# Patient Record
Sex: MALE | Race: WHITE | URBAN - METROPOLITAN AREA
[De-identification: names, ages, dates, MRNs, and addresses within clinical notes are randomized per-mention and may not be internally consistent; named-entity substitution may affect disease eponyms.]

---

## 2021-08-24 ENCOUNTER — OFFICE VISIT (OUTPATIENT)
Dept: URBAN - METROPOLITAN AREA CLINIC 46 | Facility: CLINIC | Age: 86
End: 2021-08-24

## 2021-08-24 PROBLEM — 15188001 HEARING LOSS: Status: ACTIVE | Noted: 2021-08-24

## 2021-08-24 PROBLEM — 13644009 HYPERCHOLESTEROLEMIA: Status: ACTIVE | Noted: 2021-08-24

## 2021-08-28 ENCOUNTER — TELEPHONE ENCOUNTER (OUTPATIENT)
Dept: URBAN - METROPOLITAN AREA CLINIC 13 | Facility: CLINIC | Age: 86
End: 2021-08-28

## 2021-08-29 ENCOUNTER — TELEPHONE ENCOUNTER (OUTPATIENT)
Dept: URBAN - METROPOLITAN AREA CLINIC 13 | Facility: CLINIC | Age: 86
End: 2021-08-29

## 2021-08-29 RX ORDER — FOLIC ACID/VIT B COMPLEX AND C 400 MCG
TABLET ORAL
Status: ACTIVE | COMMUNITY

## 2021-08-29 RX ORDER — CEPHALEXIN 500 MG/1
CAPSULE ORAL
Status: ACTIVE | COMMUNITY

## 2021-08-29 RX ORDER — ASPIRIN 81 MG/1
TABLET, COATED ORAL
Status: ACTIVE | COMMUNITY

## 2021-08-29 RX ORDER — METOPROLOL SUCCINATE 25 MG/1
TABLET, EXTENDED RELEASE ORAL
Status: ACTIVE | COMMUNITY

## 2021-08-29 RX ORDER — ATORVASTATIN CALCIUM 20 MG/1
TABLET ORAL
Status: ACTIVE | COMMUNITY

## 2021-08-29 RX ORDER — DIPHENHYDRAMINE HCL 25 MG/1
TABLET ORAL
Status: ACTIVE | COMMUNITY

## 2021-08-29 RX ORDER — VALACYCLOVIR 1 G/1
TABLET, FILM COATED ORAL
Status: ACTIVE | COMMUNITY

## 2021-08-29 RX ORDER — PANTOPRAZOLE SODIUM 40 MG/1
TABLET, DELAYED RELEASE ORAL
Status: ACTIVE | COMMUNITY
Start: 2021-08-24

## 2021-09-28 ENCOUNTER — OFFICE VISIT (OUTPATIENT)
Dept: URBAN - METROPOLITAN AREA CLINIC 48 | Facility: CLINIC | Age: 86
End: 2021-09-28
Payer: MEDICARE

## 2021-09-28 VITALS
DIASTOLIC BLOOD PRESSURE: 58 MMHG | HEIGHT: 66 IN | TEMPERATURE: 98.1 F | WEIGHT: 150.8 LBS | SYSTOLIC BLOOD PRESSURE: 122 MMHG | HEART RATE: 60 BPM | BODY MASS INDEX: 24.23 KG/M2

## 2021-09-28 DIAGNOSIS — K21.9: ICD-10-CM

## 2021-09-28 DIAGNOSIS — R19.4 ALTERED BOWEL FUNCTION: ICD-10-CM

## 2021-09-28 PROCEDURE — 99213 OFFICE O/P EST LOW 20 MIN: CPT | Performed by: INTERNAL MEDICINE

## 2021-09-28 RX ORDER — PANTOPRAZOLE SODIUM 40 MG/1
TABLET, DELAYED RELEASE ORAL
Status: ACTIVE | COMMUNITY
Start: 2021-08-24

## 2021-09-28 RX ORDER — ATORVASTATIN CALCIUM 20 MG/1
TABLET ORAL
Status: ACTIVE | COMMUNITY

## 2021-09-28 RX ORDER — FOLIC ACID/VIT B COMPLEX AND C 400 MCG
TABLET ORAL
Status: ACTIVE | COMMUNITY

## 2021-09-28 RX ORDER — DIPHENHYDRAMINE HCL 25 MG/1
TABLET ORAL
Status: ACTIVE | COMMUNITY

## 2021-09-28 RX ORDER — CEPHALEXIN 500 MG/1
CAPSULE ORAL
Status: ACTIVE | COMMUNITY

## 2021-09-28 RX ORDER — PANTOTHENIC ACID (VIT B5) 500 MG
AS DIRECTED TABLET ORAL
Status: ACTIVE | COMMUNITY

## 2021-09-28 RX ORDER — ASPIRIN 81 MG/1
TABLET, COATED ORAL
Status: ACTIVE | COMMUNITY

## 2021-09-28 RX ORDER — PANTOPRAZOLE SODIUM 40 MG/1
TABLET, DELAYED RELEASE ORAL
OUTPATIENT
Start: 2021-08-24

## 2021-09-28 RX ORDER — VALACYCLOVIR 1 G/1
TABLET, FILM COATED ORAL
Status: ACTIVE | COMMUNITY

## 2021-09-28 RX ORDER — METOPROLOL SUCCINATE 25 MG/1
TABLET, EXTENDED RELEASE ORAL
Status: ACTIVE | COMMUNITY

## 2021-09-28 NOTE — HPI-TODAY'S VISIT:
Demario Banerjee is a 86 year old,  male, who was seen about a month ago with concerns regarding GERD and change in bowels. He also saw ENT in October with complaints of coughing when swallowing solids and liquids. He reported coughing and sneezing when he eats, and was using Tums and OTC Nexium on an as needed basis, but nothing daily. He denied any difficulty passing the food when he swallows. He was started on Pantoprazole 40 mg daily and reports 75% improvement in symptoms; still denies difficulty swallowing.   He also c/o change in bowels over the last 6 weeks. He normally has one bowel movement per day, but stated stools have been loose aside from 2 normal stools in the last several weeks; he has also had urgency and a few episodes of incontinence. He denied abdominal pain or blood in stool. Stool studies obtained and negative for infectious pathogens, but with mild increase in fecal calpro and  moderate pancreatic insufficiency. He was treated with Flagyl 10 days followed by probiotic. He reports diarrhea has resolved, and he is now mildly constipated, having small, hard stools.   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

## 2021-10-11 ENCOUNTER — TELEPHONE ENCOUNTER (OUTPATIENT)
Dept: URBAN - METROPOLITAN AREA CLINIC 46 | Facility: CLINIC | Age: 86
End: 2021-10-11

## 2021-10-11 RX ORDER — PANTOPRAZOLE SODIUM 40 MG/1
1 TABLET TABLET, DELAYED RELEASE ORAL
Qty: 90 | Refills: 2
Start: 2021-08-24

## 2021-10-14 ENCOUNTER — OFFICE VISIT (OUTPATIENT)
Dept: URBAN - METROPOLITAN AREA CLINIC 48 | Facility: CLINIC | Age: 86
End: 2021-10-14
Payer: MEDICARE

## 2021-10-14 VITALS
TEMPERATURE: 77.5 F | OXYGEN SATURATION: 97 % | WEIGHT: 151.6 LBS | DIASTOLIC BLOOD PRESSURE: 66 MMHG | HEART RATE: 54 BPM | SYSTOLIC BLOOD PRESSURE: 128 MMHG | HEIGHT: 66 IN | BODY MASS INDEX: 24.36 KG/M2

## 2021-10-14 DIAGNOSIS — K21.9: ICD-10-CM

## 2021-10-14 DIAGNOSIS — R19.4 ALTERED BOWEL FUNCTION: ICD-10-CM

## 2021-10-14 PROCEDURE — 99213 OFFICE O/P EST LOW 20 MIN: CPT | Performed by: PHYSICIAN ASSISTANT

## 2021-10-14 RX ORDER — VALACYCLOVIR 1 G/1
TABLET, FILM COATED ORAL
Status: ON HOLD | COMMUNITY

## 2021-10-14 RX ORDER — FOLIC ACID/VIT B COMPLEX AND C 400 MCG
TABLET ORAL
Status: ON HOLD | COMMUNITY

## 2021-10-14 RX ORDER — PANTOTHENIC ACID (VIT B5) 500 MG
AS DIRECTED TABLET ORAL
Status: ACTIVE | COMMUNITY

## 2021-10-14 RX ORDER — METOPROLOL SUCCINATE 25 MG/1
TABLET, EXTENDED RELEASE ORAL
Status: ACTIVE | COMMUNITY

## 2021-10-14 RX ORDER — DIPHENHYDRAMINE HCL 25 MG/1
TABLET ORAL
Status: ACTIVE | COMMUNITY

## 2021-10-14 RX ORDER — CEPHALEXIN 500 MG/1
CAPSULE ORAL
Status: ACTIVE | COMMUNITY

## 2021-10-14 RX ORDER — ATORVASTATIN CALCIUM 20 MG/1
TABLET ORAL
Status: ACTIVE | COMMUNITY

## 2021-10-14 RX ORDER — ASPIRIN 81 MG/1
TABLET, COATED ORAL
Status: ACTIVE | COMMUNITY

## 2021-10-14 RX ORDER — PANTOPRAZOLE SODIUM 40 MG/1
1 TABLET TABLET, DELAYED RELEASE ORAL
Qty: 90 | Refills: 2 | Status: ACTIVE | COMMUNITY
Start: 2021-08-24

## 2021-10-14 RX ORDER — PANTOTHENIC ACID (VIT B5) 500 MG
AS DIRECTED TABLET ORAL
OUTPATIENT

## 2021-10-14 NOTE — PHYSICAL EXAM HENT:
Head, normocephalic, atraumatic, Face, Face within normal limits, Ears, External ears within normal limits, significant hearing impaired Nose/Nasopharynx, External nose normal appearance, nares patent, no nasal discharge, Mouth and Throat, Oral cavity appearance normal, Breath odor normal, Lips, Appearance normal

## 2021-10-14 NOTE — HPI-TODAY'S VISIT:
Demario Banerjee is a 86 year old,  male, who was recently seen with concerns regarding GERD and change in bowels. He also saw ENT in October with complaints of coughing when swallowing solids and liquids. He denied any difficulty passing the food when he swallows. He was started on Pantoprazole 40 mg daily and reported 75% improvement in symptoms at last follow up. No change with this since he was last seen.   He also c/o change in bowels for several week from normal to loose stool with urgency and a few episodes of incontinence. He denied abdominal pain or blood in stool. Stool studies obtained and negative for infectious pathogens, but with mild increase in fecal calpro and  moderate pancreatic insufficiency. He was treated with Flagyl 10 days followed by probiotic with resolution, and was even mildly constipated. He returns today due to continued bowel issues. He states he had diarrhea several times on Sunday with significant urgency (no diarrhea otherwise), and since then has been constipated.  He eats a lot of fiber in his diet. He has no abdominal pain or blood in stool. Weight is stable.   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

## 2021-10-28 ENCOUNTER — OFFICE VISIT (OUTPATIENT)
Dept: URBAN - METROPOLITAN AREA CLINIC 48 | Facility: CLINIC | Age: 86
End: 2021-10-28
Payer: MEDICARE

## 2021-10-28 ENCOUNTER — LAB OUTSIDE AN ENCOUNTER (OUTPATIENT)
Dept: URBAN - METROPOLITAN AREA CLINIC 48 | Facility: CLINIC | Age: 86
End: 2021-10-28

## 2021-10-28 VITALS
BODY MASS INDEX: 24.08 KG/M2 | HEART RATE: 52 BPM | WEIGHT: 149.8 LBS | HEIGHT: 66 IN | SYSTOLIC BLOOD PRESSURE: 138 MMHG | TEMPERATURE: 97.7 F | DIASTOLIC BLOOD PRESSURE: 73 MMHG

## 2021-10-28 DIAGNOSIS — K21.9: ICD-10-CM

## 2021-10-28 DIAGNOSIS — Z86.010 PERSONAL HISTORY OF COLONIC POLYPS: ICD-10-CM

## 2021-10-28 DIAGNOSIS — R19.4 ALTERED BOWEL FUNCTION: ICD-10-CM

## 2021-10-28 DIAGNOSIS — R63.4 WEIGHT LOSS: ICD-10-CM

## 2021-10-28 DIAGNOSIS — R14.3 EXCESSIVE FLATUS: ICD-10-CM

## 2021-10-28 DIAGNOSIS — R63.0 LOSS OF APPETITE: ICD-10-CM

## 2021-10-28 PROCEDURE — 99214 OFFICE O/P EST MOD 30 MIN: CPT | Performed by: INTERNAL MEDICINE

## 2021-10-28 RX ORDER — METOPROLOL SUCCINATE 25 MG/1
TABLET, EXTENDED RELEASE ORAL
COMMUNITY

## 2021-10-28 RX ORDER — CEPHALEXIN 500 MG/1
CAPSULE ORAL
COMMUNITY

## 2021-10-28 RX ORDER — VALACYCLOVIR 1 G/1
TABLET, FILM COATED ORAL
COMMUNITY

## 2021-10-28 RX ORDER — ATORVASTATIN CALCIUM 20 MG/1
TABLET ORAL
COMMUNITY

## 2021-10-28 RX ORDER — FOLIC ACID/VIT B COMPLEX AND C 400 MCG
TABLET ORAL
COMMUNITY

## 2021-10-28 RX ORDER — ASPIRIN 81 MG/1
TABLET, COATED ORAL
COMMUNITY

## 2021-10-28 RX ORDER — PANTOTHENIC ACID (VIT B5) 500 MG
AS DIRECTED TABLET ORAL
COMMUNITY

## 2021-10-28 RX ORDER — DIPHENHYDRAMINE HCL 25 MG/1
TABLET ORAL
COMMUNITY

## 2021-10-28 RX ORDER — PANTOPRAZOLE SODIUM 40 MG/1
1 TABLET TABLET, DELAYED RELEASE ORAL
Qty: 90 | Refills: 2 | COMMUNITY
Start: 2021-08-24

## 2021-10-28 NOTE — HPI-TODAY'S VISIT:
Demario Banerjee is a 86 year old,  male, who presents for f/u regarding GERD and change in bowels. He also saw ENT in October with complaints of coughing when swallowing solids and liquids. He denied any difficulty passing the food when he swallows. He was started on Pantoprazole 40 mg daily and reported improvement, but incomplete resolution. He also feels his appetite is overall decreased, and he has slowly been losing weight.   He also c/o change in bowels for several week from normal to loose stool with urgency and a few episodes of incontinence. He denied abdominal pain or blood in stool. Stool studies obtained and negative for infectious pathogens, but with mild increase in fecal calpro and  moderate pancreatic insufficiency. He was treated with Flagyl X 10 days followed by probiotic with resolution, and was even mildly constipated. Seen two weeks ago with one bout of diarrhea, and mostly constipation. Advised bowel clean out with Mg Citrate followed by Daily Miralax. He states he lost the instructions given, and took a laxative recommended by the pharmacist; continues to take Miralax nightly. He is having a bowel movement every morning, but continues to have excessive gas, and sensation of incomplete evacuation. He states "he just feels like something is not right." Still denies blood in stool. He eats a lot of fiber in his diet.   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   He had an echocardiogram January 2020 which showed EF 74%, mildl diastolic dysfunction and mildly reduced systolic function; mild MV thickening. He also had NM MPI at that time which showed low risk for cardiovascular event. He remains very active at present.

## 2021-12-16 ENCOUNTER — OFFICE VISIT (OUTPATIENT)
Dept: URBAN - METROPOLITAN AREA SURGERY CENTER 27 | Facility: SURGERY CENTER | Age: 86
End: 2021-12-16
Payer: MEDICARE

## 2021-12-16 ENCOUNTER — CLAIMS CREATED FROM THE CLAIM WINDOW (OUTPATIENT)
Dept: URBAN - METROPOLITAN AREA CLINIC 4 | Facility: CLINIC | Age: 86
End: 2021-12-16
Payer: MEDICARE

## 2021-12-16 DIAGNOSIS — K64.1 BLEEDING GRADE II HEMORRHOIDS: ICD-10-CM

## 2021-12-16 DIAGNOSIS — K63.5 BENIGN COLON POLYP: ICD-10-CM

## 2021-12-16 DIAGNOSIS — R13.10 ABNORMAL DEGLUTITION: ICD-10-CM

## 2021-12-16 DIAGNOSIS — K63.89 POLYP, HYPERPLASTIC: ICD-10-CM

## 2021-12-16 DIAGNOSIS — R19.4 ALTERATION IN BOWEL ELIMINATION: ICD-10-CM

## 2021-12-16 DIAGNOSIS — K57.30 ACQUIRED DIVERTICULOSIS OF COLON: ICD-10-CM

## 2021-12-16 PROCEDURE — 88313 SPECIAL STAINS GROUP 2: CPT | Performed by: PATHOLOGY

## 2021-12-16 PROCEDURE — 88342 IMHCHEM/IMCYTCHM 1ST ANTB: CPT | Performed by: PATHOLOGY

## 2021-12-16 PROCEDURE — 88305 TISSUE EXAM BY PATHOLOGIST: CPT | Performed by: PATHOLOGY

## 2021-12-16 PROCEDURE — 43248 EGD GUIDE WIRE INSERTION: CPT | Performed by: INTERNAL MEDICINE

## 2021-12-16 PROCEDURE — 45380 COLONOSCOPY AND BIOPSY: CPT | Performed by: INTERNAL MEDICINE

## 2021-12-16 PROCEDURE — G8907 PT DOC NO EVENTS ON DISCHARG: HCPCS | Performed by: INTERNAL MEDICINE

## 2021-12-16 PROCEDURE — 45385 COLONOSCOPY W/LESION REMOVAL: CPT | Performed by: INTERNAL MEDICINE

## 2021-12-16 RX ORDER — ATORVASTATIN CALCIUM 20 MG/1
TABLET ORAL
COMMUNITY

## 2021-12-16 RX ORDER — METOPROLOL SUCCINATE 25 MG/1
TABLET, EXTENDED RELEASE ORAL
COMMUNITY

## 2021-12-16 RX ORDER — FOLIC ACID/VIT B COMPLEX AND C 400 MCG
TABLET ORAL
COMMUNITY

## 2021-12-16 RX ORDER — CEPHALEXIN 500 MG/1
CAPSULE ORAL
COMMUNITY

## 2021-12-16 RX ORDER — PANTOTHENIC ACID (VIT B5) 500 MG
AS DIRECTED TABLET ORAL
COMMUNITY

## 2021-12-16 RX ORDER — VALACYCLOVIR 1 G/1
TABLET, FILM COATED ORAL
COMMUNITY

## 2021-12-16 RX ORDER — ASPIRIN 81 MG/1
TABLET, COATED ORAL
COMMUNITY

## 2021-12-16 RX ORDER — PANTOPRAZOLE SODIUM 40 MG/1
1 TABLET TABLET, DELAYED RELEASE ORAL
Qty: 90 | Refills: 2 | COMMUNITY
Start: 2021-08-24

## 2021-12-16 RX ORDER — DIPHENHYDRAMINE HCL 25 MG/1
TABLET ORAL
COMMUNITY

## 2022-01-24 ENCOUNTER — DASHBOARD ENCOUNTERS (OUTPATIENT)
Age: 87
End: 2022-01-24

## 2022-01-24 ENCOUNTER — OFFICE VISIT (OUTPATIENT)
Dept: URBAN - METROPOLITAN AREA CLINIC 48 | Facility: CLINIC | Age: 87
End: 2022-01-24
Payer: MEDICARE

## 2022-01-24 VITALS
HEIGHT: 66 IN | BODY MASS INDEX: 24.78 KG/M2 | HEART RATE: 54 BPM | DIASTOLIC BLOOD PRESSURE: 70 MMHG | OXYGEN SATURATION: 98 % | WEIGHT: 154.2 LBS | SYSTOLIC BLOOD PRESSURE: 122 MMHG | TEMPERATURE: 97.5 F

## 2022-01-24 DIAGNOSIS — R19.4 ALTERED BOWEL FUNCTION: ICD-10-CM

## 2022-01-24 DIAGNOSIS — Z86.010 PERSONAL HISTORY OF COLONIC POLYPS: ICD-10-CM

## 2022-01-24 DIAGNOSIS — R63.4 WEIGHT LOSS: ICD-10-CM

## 2022-01-24 DIAGNOSIS — R63.0 LOSS OF APPETITE: ICD-10-CM

## 2022-01-24 DIAGNOSIS — R14.3 EXCESSIVE FLATUS: ICD-10-CM

## 2022-01-24 DIAGNOSIS — K21.9: ICD-10-CM

## 2022-01-24 PROBLEM — 235595009 GASTROESOPHAGEAL REFLUX DISEASE: Status: ACTIVE | Noted: 2021-12-06

## 2022-01-24 PROBLEM — 428283002: Status: ACTIVE | Noted: 2021-10-28

## 2022-01-24 PROBLEM — 89362005: Status: ACTIVE | Noted: 2022-01-24

## 2022-01-24 PROBLEM — 80301007: Status: ACTIVE | Noted: 2022-01-24

## 2022-01-24 PROBLEM — 79890006: Status: ACTIVE | Noted: 2021-10-28

## 2022-01-24 PROBLEM — 88111009 ALTERED BOWEL FUNCTION: Status: ACTIVE | Noted: 2021-12-06

## 2022-01-24 PROCEDURE — 99213 OFFICE O/P EST LOW 20 MIN: CPT | Performed by: NURSE PRACTITIONER

## 2022-01-24 RX ORDER — ATORVASTATIN CALCIUM 20 MG/1
TABLET ORAL
Status: ACTIVE | COMMUNITY

## 2022-01-24 RX ORDER — METOPROLOL SUCCINATE 25 MG/1
TABLET, EXTENDED RELEASE ORAL
Status: ACTIVE | COMMUNITY

## 2022-01-24 RX ORDER — DIPHENHYDRAMINE HCL 25 MG/1
TABLET ORAL
Status: ACTIVE | COMMUNITY

## 2022-01-24 RX ORDER — PANTOPRAZOLE SODIUM 40 MG/1
1 TABLET TABLET, DELAYED RELEASE ORAL
Qty: 90 | Refills: 2 | Status: ACTIVE | COMMUNITY
Start: 2021-08-24

## 2022-01-24 RX ORDER — VALACYCLOVIR 1 G/1
TABLET, FILM COATED ORAL
Status: ACTIVE | COMMUNITY

## 2022-01-24 RX ORDER — ASPIRIN 81 MG/1
TABLET, COATED ORAL
Status: ACTIVE | COMMUNITY

## 2022-01-24 RX ORDER — CEPHALEXIN 500 MG/1
CAPSULE ORAL
Status: ACTIVE | COMMUNITY

## 2022-01-24 RX ORDER — FOLIC ACID/VIT B COMPLEX AND C 400 MCG
TABLET ORAL
Status: ACTIVE | COMMUNITY

## 2022-01-24 RX ORDER — PANTOTHENIC ACID (VIT B5) 500 MG
AS DIRECTED TABLET ORAL
Status: ACTIVE | COMMUNITY

## 2022-01-24 NOTE — PHYSICAL EXAM CONSTITUTIONAL:
well developed, well nourished , in no acute distress , ambulating without difficulty , normal communication ability, [place]

## 2022-01-24 NOTE — HPI-TODAY'S VISIT:
Demario Banerjee is a 86 year old,  male, who presents for f/u regarding GERD and change in bowels. Seen by ENT in October with complaints of coughing when swallowing solids and liquids. He denied any difficulty passing the food when he swallows. At that time, he was started on Pantoprazole 40 mg daily and reported improvement, but incomplete resolution. EGD 12/2021 with 54 Fr dilatation was normal. At this time, he is eating soft foods while waiting on his dentures to be made. He denies dysphagia and GERD is well controlled now. Weight has been steady.    He also had a change in bowels for several weeks from normal to loose stool with urgency and a few episodes of incontinence. Stool studies obtained and negative for infectious pathogens, but with mild increase in fecal calpro and  moderate pancreatic insufficiency. He was treated with Flagyl X 10 days followed by probiotic with resolution.  Currently, he is having daily bowel movement of formed, non-bloody stool and is taking a stool softener occasionally. Colonoscopy 12/2021 showed colon polyps, diverticulosis, and internal hemorrhoids.   The patient has a history of a aortic valve replacement. He is active and overall feels well.

## 2022-03-28 ENCOUNTER — OFFICE VISIT (OUTPATIENT)
Dept: URBAN - METROPOLITAN AREA CLINIC 48 | Facility: CLINIC | Age: 87
End: 2022-03-28